# Patient Record
Sex: FEMALE | Race: BLACK OR AFRICAN AMERICAN | Employment: OTHER | URBAN - METROPOLITAN AREA
[De-identification: names, ages, dates, MRNs, and addresses within clinical notes are randomized per-mention and may not be internally consistent; named-entity substitution may affect disease eponyms.]

---

## 2017-05-01 ENCOUNTER — OFFICE VISIT (OUTPATIENT)
Dept: ENDOCRINOLOGY | Age: 72
End: 2017-05-01

## 2017-05-01 VITALS
BODY MASS INDEX: 31.86 KG/M2 | WEIGHT: 203 LBS | SYSTOLIC BLOOD PRESSURE: 138 MMHG | HEART RATE: 80 BPM | DIASTOLIC BLOOD PRESSURE: 73 MMHG | HEIGHT: 67 IN

## 2017-05-01 DIAGNOSIS — E05.90 SUBCLINICAL HYPERTHYROIDISM: Primary | ICD-10-CM

## 2017-05-01 DIAGNOSIS — E04.2 MULTINODULAR GOITER (NONTOXIC): ICD-10-CM

## 2017-05-01 RX ORDER — BISMUTH SUBSALICYLATE 262 MG
1 TABLET,CHEWABLE ORAL DAILY
COMMUNITY

## 2017-05-01 NOTE — PROGRESS NOTES
Chief Complaint   Patient presents with    Thyroid Problem     will call with name of PCP, Pharmacy verified. History of Present Illness: Lonny Rojas is a 73 y.o. female here for follow up of subclinical hypothyroidism and MNG. I initially saw Ms Holland Seo in October 2015. Her TSH was 0.2 with normal T3/T4, normal TRAb, and TPO with unremarkable thyroid Uptake and scan. I did not feel she needed any treatment or therapy at that time. During her work up for abnormal thyroid labs her PCP ordered a thyroid US that showed Right thyroid lobe measuring [id number] mm, the left thyroid lobe measuring 69m62a03 mm and multiple bilateral nodules, solid and complex, all sub-CM. Largest 4mm on right an 7mm on left. At our last visit in August 2016 she was clinically euthyroid and her TSH was 0.324 with FT4 of 1.10 and TT3 of 99. She was not started on any therapy at that time. I ordered a repeat thyroid US but this was never performed. Pt notes she has been having issues of aching in her knees and legs, she notes her PCP is testing her for OA and osteoporosis. She denies issues of CP, palpitations, SOB, tremors, heat or cold intolerance, diarrhea or constipation. She denies LE edema, dysphagia, dysphonia, or chocking. No recent illnesses, injuries or hospitalizations. Pt has a hx of well controlled DM, HTN, HLD, hypovitaminosis D and peripheral neuropathy. No hx of CVA or CAD. Pt notes she has been able to stop her DM medications. Current Outpatient Prescriptions   Medication Sig    multivitamin (ONE A DAY) tablet Take 1 Tab by mouth daily.  atorvastatin (LIPITOR) 40 mg tablet     lisinopril (PRINIVIL, ZESTRIL) 20 mg tablet daily.  naproxen (NAPROSYN) 500 mg tablet as needed.  traZODone (DESYREL) 50 mg tablet nightly as needed.  fluticasone (FLONASE) 50 mcg/actuation nasal spray nightly.  aspirin 81 mg tablet Take 81 mg by mouth.      No current facility-administered medications for this visit. No Known Allergies  Review of Systems:  - Cardiovascular: no chest pain  - Neurological: no tremors  - Integumentary: skin is normal    Physical Examination:  Blood pressure 138/73, pulse 80, height 5' 7\" (1.702 m), weight 203 lb (92.1 kg). - General: pleasant, no distress, good eye contact   - Neck: no thyromegaly or thyroid bruits  - Cardiovascular: regular, normal rate, nl s1 and s2, no m/r/g   - Integumentary: skin is normal, no edema  - Neurological: reflexes 2+ at biceps, no tremors  - Psychiatric: normal mood and affect    Data Reviewed: Will request labs from PCP    Assessment/Plan:   1) Subclinical hyperthyroidism > Pt is clinically euthyroid, has no hx of cardiac or arrhythmia issues. Will check TFTs today and continue to monitor. If her TSH drops below 0.1 or her T3/T4 begin to elevate will need to discuss treatment options. 2) Multinodular goiter > Will order a thyroid US to look for evidence of change in the thyroid nodules. Pt voices understanding and agreement with the plan. Pain noted and pt was recommended to call her PCP for further evaluation and treatment, as needed    RTC 1 year      Follow-up Disposition:  Return in about 1 year (around 5/1/2018).     Copy sent to:  Dr. Lauren Mcghee

## 2017-05-02 LAB
T3 SERPL-MCNC: 111 NG/DL (ref 71–180)
T4 FREE SERPL-MCNC: 0.96 NG/DL (ref 0.82–1.77)
TSH SERPL DL<=0.005 MIU/L-ACNC: 0.37 UIU/ML (ref 0.45–4.5)

## 2017-05-02 NOTE — PROGRESS NOTES
Spoke with pt regarding her labs. Pt's TSH is stable with normal T3/T4. Pt has Subclinical hyperthyroidism. No plans to start any treatment at this time. Thyroid US pending.

## 2017-05-05 ENCOUNTER — HOSPITAL ENCOUNTER (OUTPATIENT)
Dept: ULTRASOUND IMAGING | Age: 72
Discharge: HOME | End: 2017-05-05
Attending: INTERNAL MEDICINE
Payer: MEDICARE

## 2017-05-05 DIAGNOSIS — E04.2 MULTINODULAR GOITER (NONTOXIC): ICD-10-CM

## 2017-05-05 PROCEDURE — 76536 US EXAM OF HEAD AND NECK: CPT

## 2017-05-05 NOTE — PROGRESS NOTES
Her Thyroid Ultrasound is looking good, there is no change from 2015. No further work-up or testing is needed at this time. I will see her back in 1 year.

## 2017-10-11 ENCOUNTER — HOSPITAL ENCOUNTER (OUTPATIENT)
Dept: MAMMOGRAPHY | Age: 72
Discharge: HOME | End: 2017-10-11
Attending: GENERAL ACUTE CARE HOSPITAL
Payer: MEDICARE

## 2017-10-11 DIAGNOSIS — Z12.31 VISIT FOR SCREENING MAMMOGRAM: ICD-10-CM

## 2017-10-11 PROCEDURE — 77067 SCR MAMMO BI INCL CAD: CPT

## 2018-05-01 ENCOUNTER — OFFICE VISIT (OUTPATIENT)
Dept: ENDOCRINOLOGY | Age: 73
End: 2018-05-01

## 2018-05-01 VITALS
DIASTOLIC BLOOD PRESSURE: 71 MMHG | HEIGHT: 67 IN | HEART RATE: 83 BPM | SYSTOLIC BLOOD PRESSURE: 121 MMHG | WEIGHT: 202.2 LBS | BODY MASS INDEX: 31.74 KG/M2

## 2018-05-01 DIAGNOSIS — E04.2 MULTINODULAR GOITER (NONTOXIC): ICD-10-CM

## 2018-05-01 DIAGNOSIS — E05.90 SUBCLINICAL HYPERTHYROIDISM: Primary | ICD-10-CM

## 2018-05-01 RX ORDER — LISINOPRIL AND HYDROCHLOROTHIAZIDE 12.5; 2 MG/1; MG/1
TABLET ORAL DAILY
COMMUNITY
Start: 2018-04-02

## 2018-05-01 RX ORDER — OMEPRAZOLE 20 MG/1
CAPSULE, DELAYED RELEASE ORAL DAILY
COMMUNITY
Start: 2018-04-20

## 2018-05-01 NOTE — PROGRESS NOTES
Chief Complaint   Patient presents with    Thyroid Problem     pcp and pharmacy verified   Records since last visit reviewed  History of Present Illness: Dick Merritt is a 81 y.o. female here for follow up of subclinical hypothyroidism and MNG. I initially saw Ms Dionne Gonzales in October 2015. Her TSH was 0.2 with normal T3/T4, normal TRAb, and TPO with unremarkable thyroid Uptake and scan. I did not feel she needed any treatment or therapy at that time. During her work up for abnormal thyroid labs her PCP ordered a thyroid US that showed Right thyroid lobe measuring [id number] mm, the left thyroid lobe measuring [id number] mm and multiple bilateral nodules, solid and complex, all sub-CM. Largest 4mm on right an 7mm on left. At our last visit in August 2017 she was clinically euthyroid and her TSH was 0.369 with FT4 of 0.96 and TT3 of 111. She was not started on any therapy at that time. I ordered a repeat thyroid US and it was unchanged from 2015. Pt notes she has been having issues of aching in her knees and legs, she notes her PCP has diagnosed her with OA. She denies issues of CP, palpitations, SOB, tremors, heat or cold intolerance, diarrhea or constipation. She denies LE edema, dysphagia, dysphonia, or chocking. No recent illnesses, injuries or hospitalizations. Pt has a hx of well controlled DM, HTN, HLD, hypovitaminosis D and peripheral neuropathy. No hx of CVA or CAD. Pt notes she has been able to stop her DM medications. Current Outpatient Prescriptions   Medication Sig    lisinopril-hydroCHLOROthiazide (PRINZIDE, ZESTORETIC) 20-12.5 mg per tablet daily.  omeprazole (PRILOSEC) 20 mg capsule     multivitamin (ONE A DAY) tablet Take 1 Tab by mouth daily.  atorvastatin (LIPITOR) 40 mg tablet     naproxen (NAPROSYN) 500 mg tablet as needed.  traZODone (DESYREL) 50 mg tablet nightly as needed.  fluticasone (FLONASE) 50 mcg/actuation nasal spray nightly.     aspirin 81 mg tablet Take 81 mg by mouth. No current facility-administered medications for this visit. No Known Allergies  Review of Systems:  - Cardiovascular: no chest pain  - Neurological: no tremors  - Integumentary: skin is normal    Physical Examination:  Blood pressure 121/71, pulse 83, height 5' 7\" (1.702 m), weight 202 lb 3.2 oz (91.7 kg). - General: pleasant, no distress, good eye contact   - Neck: no thyromegaly or thyroid bruits  - Cardiovascular: regular, normal rate, nl s1 and s2, no m/r/g   - Integumentary: skin is normal, no edema  - Neurological: reflexes 2+ at biceps, no tremors  - Psychiatric: normal mood and affect    Data Reviewed: Will request labs from PCP    Assessment/Plan:   1) Subclinical hyperthyroidism > Pt is clinically euthyroid, has no hx of cardiac or arrhythmia issues. Will check TFTs today and continue to monitor. If her TSH drops below 0.1 or her T3/T4 begin to elevate will need to discuss treatment options. 2) Multinodular goiter > Her thyroid US in 2017 was stable from 2015 with no clinically significant changes. No need for further work-up or evaluation, unless she has a change in her clinical picture. Pt voices understanding and agreement with the plan. Pain noted and pt was recommended to call her PCP for further evaluation and treatment, as needed    RTC 1 year      Follow-up Disposition:  Return in about 1 year (around 5/1/2019).     Copy sent to:  Dr. Rosales Lee

## 2018-05-01 NOTE — MR AVS SNAPSHOT
[address] [place] [address] [address] [address] [phone] Patient: Heath Salinas MRN: [id number] Visit Information Date & Time Provider Department Dept. Phone Encounter #  
 5/1/2018  9:10 AM Radha Perez, [address] [place] [address] Diabetes and Endocrinology 21  Follow-up Instructions Return in about 1 year (around 5/1/2019). Upcoming Health Maintenance Date Due Hepatitis C Screening 1945 DTaP/Tdap/Td series (1 - Tdap) 11/3/1968 FOBT Q 1 YEAR AGE 50-75 11/3/1997 ZOSTER VACCINE AGE 60> 9/3/2007 GLAUCOMA SCREENING Q2Y 11/3/2012 Bone Densitometry (Dexa) Screening 11/3/2012 Pneumococcal 65+ Low/Medium Risk (1 of 2 - PCV13) 11/3/2012 MEDICARE YEARLY EXAM 3/14/2018 Influenza Age 5 to Adult 8/1/2018 BREAST CANCER SCRN MAMMOGRAM 10/11/2019 Allergies as of 5/1/2018  Review Complete On: 5/1/2018 By: Radha Perez MD  
 No Known Allergies Current Immunizations  Never Reviewed No immunizations on file. Not reviewed this visit You Were Diagnosed With   
  
 Codes Comments Subclinical hyperthyroidism    -  Primary ICD-10-CM: E05.90 ICD-9-CM: 242.90 Multinodular goiter (nontoxic)     ICD-10-CM: I61.0 ICD-9-CM: 680. 1 Vitals BP Pulse Height(growth percentile) Weight(growth percentile) BMI Smoking Status 121/71 83 5' 7\" (1.702 m) 202 lb 3.2 oz (91.7 kg) 31.67 kg/m2 Former Smoker BMI and BSA Data Body Mass Index Body Surface Area  
 31.67 kg/m 2 2.08 m 2 [facility] [address] [phone] Your Updated Medication List  
  
   
This list is accurate as of 5/1/18  9:38 AM.  Always use your most recent med list.  
  
  
  
  
 aspirin 81 mg tablet Take 81 mg by mouth. atorvastatin 40 mg tablet Commonly known as:  LIPITOR fluticasone 50 mcg/actuation nasal spray Commonly known as:  FLONASE  
nightly. lisinopril-hydroCHLOROthiazide 20-12.5 mg per tablet Commonly known as:  PRINZIDE, ZESTORETIC  
daily. multivitamin tablet Commonly known as:  ONE A DAY Take 1 Tab by mouth daily. naproxen 500 mg tablet Commonly known as:  NAPROSYN  
as needed. omeprazole 20 mg capsule Commonly known as:  PRILOSEC  
  
 traZODone 50 mg tablet Commonly known as:  DESYREL  
nightly as needed. We Performed the Following   
 T3 TOTAL [id number] CPT(R)] T4, FREE [id number] CPT(R)] TSH 3RD GENERATION [49949 CPT(R)] Follow-up Instructions Return in about 1 year (around 5/1/2019). Introducing [facility]! [employer] introduces [employer] patient portal. Now you can access parts of your medical record, email your doctor's office, and request medication refills online. 1. In your internet browser, go to [email or web address]"[email or web address]. [employer]/"[employer] 2. Click on the First Time User? Click Here link in the Sign In box. You will see the New Member Sign Up page. 3. Enter your [employer] Access Code exactly as it appears below. You will not need to use this code after youve completed the sign-up process. If you do not sign up before the expiration date, you must request a new code. · [employer] Access Code: [email or web address] Expires: 7/30/2018  9:38 AM 
 
4. Enter the last four digits of your Social Security Number (xxxx) and Date of Birth (mm/dd/yyyy) as indicated and click Submit. You will be taken to the next sign-up page. 5. Create a [employer] ID. This will be your [employer] login ID and cannot be changed, so think of one that is secure and easy to remember. 6. Create a [employer] password. You can change your password at any time. 7. Enter your Password Reset Question and Answer. This can be used at a later time if you forget your password. 8. Enter your e-mail address. You will receive e-mail notification when new information is available in [address] 9. Click Sign Up. You can now view and download portions of your medical record. 10. Click the Download Summary menu link to download a portable copy of your medical information. If you have questions, please visit the Frequently Asked Questions section of the [employer] website. Remember, [employer] is NOT to be used for urgent needs. For medical emergencies, dial 911. Now available from your iPhone and Android! Please provide this summary of care documentation to your next provider. Your primary care clinician is listed as Johanny Morales. If you have any questions after today's visit, please call [phone].

## 2018-05-02 LAB
T3 SERPL-MCNC: 121 NG/DL (ref 71–180)
T4 FREE SERPL-MCNC: 1.09 NG/DL (ref 0.82–1.77)
TSH SERPL DL<=0.005 MIU/L-ACNC: 0.38 UIU/ML (ref 0.45–4.5)

## 2018-05-02 NOTE — PROGRESS NOTES
Spoke with pt regarding her TFTs. Her TSH is stable and her T3/T4 are in the normal range. No need to start any thyroid medications at this time.

## 2018-10-29 ENCOUNTER — HOSPITAL ENCOUNTER (OUTPATIENT)
Dept: MAMMOGRAPHY | Age: 73
Discharge: HOME | End: 2018-10-29
Attending: FAMILY MEDICINE
Payer: MEDICARE

## 2018-10-29 DIAGNOSIS — Z12.39 SCREENING BREAST EXAMINATION: ICD-10-CM

## 2018-10-29 PROCEDURE — 77067 SCR MAMMO BI INCL CAD: CPT

## 2019-04-22 ENCOUNTER — OFFICE VISIT (OUTPATIENT)
Dept: ENDOCRINOLOGY | Age: 74
End: 2019-04-22

## 2019-04-22 VITALS
DIASTOLIC BLOOD PRESSURE: 69 MMHG | WEIGHT: 195 LBS | HEART RATE: 92 BPM | SYSTOLIC BLOOD PRESSURE: 120 MMHG | BODY MASS INDEX: 30.61 KG/M2 | HEIGHT: 67 IN

## 2019-04-22 DIAGNOSIS — E05.90 SUBCLINICAL HYPERTHYROIDISM: Primary | ICD-10-CM

## 2019-04-22 DIAGNOSIS — E04.2 MULTINODULAR GOITER (NONTOXIC): ICD-10-CM

## 2019-04-22 NOTE — PROGRESS NOTES
Chief Complaint Patient presents with  Thyroid Problem  
  pcp and pharmacy verified Records since last visit reviewed History of Present Illness: Laura Starr is a 72 y.o. female here for follow up of subclinical hypothyroidism and MNG. I initially saw Ms Liss Roque in October 2015. Her TSH was 0.2 with normal T3/T4, normal TRAb, and TPO with unremarkable thyroid Uptake and scan. I did not feel she needed any treatment or therapy at that time. During her work up for abnormal thyroid labs her PCP ordered a thyroid US that showed Right thyroid lobe measuring [id number] mm, the left thyroid lobe measuring 60b16i36 mm and multiple bilateral nodules, solid and complex, all sub-CM. Largest 4mm on right an 7mm on left. At our last visit in May 2018 her TSH was 0.38 with FT4 of 1.09 and TT3 of 121. She was clinically euthyroid and her TSH was stable so no anti-thyroid medication was needed. She denies issues of CP, palpitations, SOB, tremors, heat or cold intolerance, diarrhea or constipation. She denies dysphagia, dysphonia, or chocking. No recent illnesses, injuries or hospitalizations. Current Outpatient Medications Medication Sig  
 lisinopril-hydroCHLOROthiazide (PRINZIDE, ZESTORETIC) 20-12.5 mg per tablet daily.  omeprazole (PRILOSEC) 20 mg capsule daily.  multivitamin (ONE A DAY) tablet Take 1 Tab by mouth daily.  atorvastatin (LIPITOR) 40 mg tablet  naproxen (NAPROSYN) 500 mg tablet as needed.  traZODone (DESYREL) 50 mg tablet nightly as needed.  fluticasone (FLONASE) 50 mcg/actuation nasal spray nightly.  aspirin 81 mg tablet Take 81 mg by mouth. No current facility-administered medications for this visit. No Known Allergies Review of Systems: 
- Cardiovascular: no chest pain 
- Neurological: no tremors - Integumentary: skin is normal 
 
Physical Examination: 
Blood pressure 120/69, pulse 92, height 5' 7\" (1.702 m), weight 195 lb (88.5 kg). - General: pleasant, no distress, good eye contact  
- Neck: no thyromegaly or thyroid bruits - Cardiovascular: regular, normal rate, nl s1 and s2, no m/r/g - Integumentary: skin is normal, no edema 
- Neurological: reflexes 2+ at biceps, no tremors - Psychiatric: normal mood and affect Data Reviewed: Will request labs from PCP Assessment/Plan:  
1) Subclinical hyperthyroidism > Pt is clinically euthyroid, has no hx of cardiac or arrhythmia issues. Will check TFTs today. If her TFTs are stable, then they will not have shown any change since 2015 and I feel she will not need to continue to see me any longer. 2) Multinodular goiter > Her thyroid US in 2017 was stable from 2015 with no clinically significant changes. No need for further work-up or evaluation, unless she has a change in her clinical picture. Pt voices understanding and agreement with the plan. Pain noted and pt was recommended to call her PCP for further evaluation and treatment, as needed Copy sent to: Dr. Broderick Carson

## 2019-04-23 LAB
T3 SERPL-MCNC: 96 NG/DL (ref 71–180)
T4 FREE SERPL-MCNC: 1.02 NG/DL (ref 0.82–1.77)
TSH SERPL DL<=0.005 MIU/L-ACNC: 0.26 UIU/ML (ref 0.45–4.5)

## 2019-04-23 NOTE — PROGRESS NOTES
Pt's TFTs are looking stable and she is clinically euthyroid. Pt does not need any anti-thyroid medications and no further follow ups are needed.

## 2019-11-19 ENCOUNTER — HOSPITAL ENCOUNTER (OUTPATIENT)
Dept: MAMMOGRAPHY | Age: 74
Discharge: HOME | End: 2019-11-19
Attending: FAMILY MEDICINE
Payer: MEDICARE

## 2019-11-19 DIAGNOSIS — Z12.31 ENCOUNTER FOR SCREENING MAMMOGRAM FOR MALIGNANT NEOPLASM OF BREAST: ICD-10-CM

## 2019-11-19 PROCEDURE — 77067 SCR MAMMO BI INCL CAD: CPT

## 2021-02-22 ENCOUNTER — TRANSCRIBE ORDER (OUTPATIENT)
Dept: SCHEDULING | Age: 76
End: 2021-02-22

## 2021-02-22 DIAGNOSIS — Z12.31 SCREENING MAMMOGRAM FOR HIGH-RISK PATIENT: Primary | ICD-10-CM

## 2021-07-06 ENCOUNTER — HOSPITAL ENCOUNTER (OUTPATIENT)
Dept: MAMMOGRAPHY | Age: 76
Discharge: HOME | End: 2021-07-06
Attending: FAMILY MEDICINE
Payer: MEDICARE

## 2021-07-06 DIAGNOSIS — Z12.31 SCREENING MAMMOGRAM FOR HIGH-RISK PATIENT: ICD-10-CM

## 2021-07-06 PROCEDURE — 77067 SCR MAMMO BI INCL CAD: CPT

## 2021-12-29 ENCOUNTER — OFFICE VISIT (OUTPATIENT)
Dept: URGENT CARE | Age: 76
End: 2021-12-29
Payer: MEDICARE

## 2021-12-29 VITALS — HEART RATE: 72 BPM | OXYGEN SATURATION: 99 % | RESPIRATION RATE: 16 BRPM | TEMPERATURE: 97.6 F

## 2021-12-29 DIAGNOSIS — Z20.822 EXPOSURE TO COVID-19 VIRUS: Primary | ICD-10-CM

## 2021-12-29 LAB — SARS-COV-2 POC: NEGATIVE

## 2021-12-29 PROCEDURE — 99202 OFFICE O/P NEW SF 15 MIN: CPT | Performed by: NURSE PRACTITIONER

## 2021-12-29 PROCEDURE — 1101F PT FALLS ASSESS-DOCD LE1/YR: CPT | Performed by: NURSE PRACTITIONER

## 2021-12-29 PROCEDURE — G8536 NO DOC ELDER MAL SCRN: HCPCS | Performed by: NURSE PRACTITIONER

## 2021-12-29 PROCEDURE — G8400 PT W/DXA NO RESULTS DOC: HCPCS | Performed by: NURSE PRACTITIONER

## 2021-12-29 PROCEDURE — G8432 DEP SCR NOT DOC, RNG: HCPCS | Performed by: NURSE PRACTITIONER

## 2021-12-29 PROCEDURE — 87426 SARSCOV CORONAVIRUS AG IA: CPT | Performed by: NURSE PRACTITIONER

## 2021-12-29 PROCEDURE — G9899 SCRN MAM PERF RSLTS DOC: HCPCS | Performed by: NURSE PRACTITIONER

## 2021-12-29 PROCEDURE — 1090F PRES/ABSN URINE INCON ASSESS: CPT | Performed by: NURSE PRACTITIONER

## 2021-12-29 PROCEDURE — G8421 BMI NOT CALCULATED: HCPCS | Performed by: NURSE PRACTITIONER

## 2021-12-29 PROCEDURE — 3017F COLORECTAL CA SCREEN DOC REV: CPT | Performed by: NURSE PRACTITIONER

## 2021-12-29 PROCEDURE — G8427 DOCREV CUR MEDS BY ELIG CLIN: HCPCS | Performed by: NURSE PRACTITIONER

## 2021-12-29 NOTE — PROGRESS NOTES
This patient was seen at [address] [place] [address] Urgent Care while in their vehicle due to COVID-19 pandemic with PPE and focused examination in order to decrease community viral transmission. The patient/guardian gave verbal consent to treat. Sherren Martyr is a 78 y.o. female who presents for COVID-19 testing. Was exposed to COVID-19 about 10 days ago. Denies any symptoms such as cough, SOB, chest tightness, congestion, ST, HA, n/v/d, fever etc. No other complaints or concerns at this time. Patient completed COVID vaccinations x 3 more than one month ago. PMH: HTN. Non-smoker. Past Medical History:   Diagnosis Date    Hypertension     Sebaceous cyst of breast 8/19/2013        Past Surgical History:   Procedure Laterality Date    HX BREAST BIOPSY Left 2000    benign    HX HYSTERECTOMY      HX TONSILLECTOMY           Family History   Problem Relation Age of Onset    Hypertension Mother     Diabetes Mother     Dementia Mother     Hypertension Sister     Sleep Apnea Sister     Heart Disease Father         MI    Heart Disease Maternal Grandmother     Hypertension Maternal Grandmother     Cancer Paternal Grandmother         Ovarian    Thyroid Disease Daughter         Social History     Socioeconomic History    Marital status:      Spouse name: Not on file    Number of children: Not on file    Years of education: Not on file    Highest education level: Not on file   Occupational History    Not on file   Tobacco Use    Smoking status: Former Smoker    Smokeless tobacco: Never Used   Substance and Sexual Activity    Alcohol use:  Yes    Drug use: Not on file    Sexual activity: Not on file   Other Topics Concern    Not on file   Social History Narrative    Not on file     Social Determinants of Health     Financial Resource Strain:     Difficulty of Paying Living Expenses: Not on file   Food Insecurity:     Worried About Running Out of Food in the Last Year: Not on file    Ran Out of Food in the Last Year: Not on file   Transportation Needs:     Lack of Transportation (Medical): Not on file    Lack of Transportation (Non-Medical): Not on file   Physical Activity:     Days of Exercise per Week: Not on file    Minutes of Exercise per Session: Not on file   Stress:     Feeling of Stress : Not on file   Social Connections:     Frequency of Communication with Friends and Family: Not on file    Frequency of Social Gatherings with Friends and Family: Not on file    Attends Mosque Services: Not on file    Active Member of [address] [place] [address] [employer] or Organizations: Not on file    Attends Club or Organization Meetings: Not on file    Marital Status: Not on file   Intimate Partner Violence:     Fear of Current or Ex-Partner: Not on file    Emotionally Abused: Not on file    Physically Abused: Not on file    Sexually Abused: Not on file   Housing Stability:     Unable to Pay for Housing in the Last Year: Not on file    Number of [place] in the Last Year: Not on file    Unstable Housing in the Last Year: Not on file                ALLERGIES: Patient has no known allergies. Review of Systems   Constitutional: Negative for activity change, appetite change, chills, diaphoresis, fatigue and fever. HENT: Negative for congestion, ear pain, rhinorrhea, sinus pressure, sinus pain and sore throat. Respiratory: Negative for cough, chest tightness, shortness of breath and wheezing. Cardiovascular: Negative for chest pain. Gastrointestinal: Negative for abdominal pain, constipation, diarrhea, nausea and vomiting. Musculoskeletal: Negative for myalgias. Skin: Negative for rash. Neurological: Negative for dizziness, weakness, light-headedness and headaches. Vitals:    12/29/21 0940   Pulse: 72   Resp: 16   Temp: 97.6 °F (36.4 °C)   SpO2: 99%       Physical Exam  Vitals and nursing note reviewed. Constitutional:       General: She is not in acute distress.      Appearance: Normal appearance. She is not ill-appearing. HENT:      Head: Normocephalic and atraumatic. Mouth/Throat:      Mouth: Mucous membranes are moist.   Eyes:      Conjunctiva/sclera: Conjunctivae normal.      Pupils: Pupils are equal, round, and reactive to light. Cardiovascular:      Rate and Rhythm: Normal rate. Pulmonary:      Effort: Pulmonary effort is normal.   Musculoskeletal:         General: Normal range of motion. Cervical back: No muscular tenderness. Skin:     General: Skin is warm and dry. Findings: No rash. Neurological:      Mental Status: She is alert and oriented to person, place, and time. Psychiatric:         Mood and Affect: Mood normal.         Behavior: Behavior normal.         MDM    Procedures      ICD-10-CM ICD-9-CM   1. Exposure to COVID-19 virus  Z20.822 V01.79       Orders Placed This Encounter    AMB POC SARS-COV-2 ANTIGEN     Order Specific Question:   Is this test for diagnosis or screening? Answer:   Screening     Order Specific Question:   Symptomatic for COVID-19 as defined by CDC? Answer:   No     Order Specific Question:   Hospitalized for COVID-19? Answer:   No     Order Specific Question:   Admitted to ICU for COVID-19? Answer:   No     Order Specific Question:   Employed in healthcare setting? Answer:   Unknown     Order Specific Question:   Resident in a congregate (group) care setting? Answer:   Unknown     Order Specific Question:   Pregnant? Answer:   No     Order Specific Question:   Previously tested for COVID-19? Answer:   Unknown      Results for orders placed or performed in visit on 12/29/21   AMB POC SARS-COV-2   Result Value Ref Range    SARS-COV-2 POC Negative Negative     Quarantine recommendations reviewed per CDC guidelines. Encouraged deep breathing exercises, ambulation, Tylenol prn- should symptoms develop. Increase fluids with electrolytes    The patient is to follow up with PCP PRN.      Red flag signs and symptoms discussed with patient/parent indicated need for ED evaluation and treatment.     Signed By: Melisa Friday, NP     December 29, 2021

## 2022-07-12 ENCOUNTER — TRANSCRIBE ORDER (OUTPATIENT)
Dept: SCHEDULING | Age: 77
End: 2022-07-12

## 2022-07-12 DIAGNOSIS — M81.0 SENILE OSTEOPOROSIS: ICD-10-CM

## 2022-07-12 DIAGNOSIS — Z12.31 SCREENING MAMMOGRAM FOR HIGH-RISK PATIENT: Primary | ICD-10-CM

## 2022-07-21 ENCOUNTER — HOSPITAL ENCOUNTER (OUTPATIENT)
Dept: MAMMOGRAPHY | Age: 77
Discharge: HOME | End: 2022-07-21
Attending: FAMILY MEDICINE
Payer: MEDICARE

## 2022-07-21 DIAGNOSIS — Z12.31 SCREENING MAMMOGRAM FOR HIGH-RISK PATIENT: ICD-10-CM

## 2022-07-21 DIAGNOSIS — M81.0 SENILE OSTEOPOROSIS: ICD-10-CM

## 2022-07-21 PROCEDURE — 77067 SCR MAMMO BI INCL CAD: CPT

## 2022-07-21 PROCEDURE — 77080 DXA BONE DENSITY AXIAL: CPT

## 2023-08-01 ENCOUNTER — HOSPITAL ENCOUNTER (OUTPATIENT)
Facility: HOSPITAL | Age: 78
Discharge: HOME | End: 2023-08-04
Payer: MEDICARE

## 2023-08-01 VITALS — WEIGHT: 177 LBS | BODY MASS INDEX: 26.22 KG/M2 | HEIGHT: 69 IN

## 2023-08-01 DIAGNOSIS — Z12.31 SCREENING MAMMOGRAM FOR HIGH-RISK PATIENT: ICD-10-CM

## 2023-08-01 PROCEDURE — 77067 SCR MAMMO BI INCL CAD: CPT

## 2024-07-29 ENCOUNTER — TRANSCRIBE ORDERS (OUTPATIENT)
Facility: HOSPITAL | Age: 79
End: 2024-07-29

## 2024-07-29 DIAGNOSIS — Z12.31 ENCOUNTER FOR SCREENING MAMMOGRAM FOR MALIGNANT NEOPLASM OF BREAST: Primary | ICD-10-CM

## 2024-08-06 ENCOUNTER — HOSPITAL ENCOUNTER (OUTPATIENT)
Facility: HOSPITAL | Age: 79
Discharge: HOME | End: 2024-08-09
Payer: MEDICARE

## 2024-08-06 VITALS — HEIGHT: 67 IN | WEIGHT: 172 LBS | BODY MASS INDEX: 27 KG/M2

## 2024-08-06 DIAGNOSIS — Z12.31 ENCOUNTER FOR SCREENING MAMMOGRAM FOR MALIGNANT NEOPLASM OF BREAST: ICD-10-CM

## 2024-08-06 PROCEDURE — 77063 BREAST TOMOSYNTHESIS BI: CPT

## 2024-11-13 ENCOUNTER — APPOINTMENT (OUTPATIENT)
Facility: HOSPITAL | Age: 79
End: 2024-11-13
Payer: MEDICARE

## 2024-11-13 ENCOUNTER — HOSPITAL ENCOUNTER (EMERGENCY)
Facility: HOSPITAL | Age: 79
Discharge: HOME | End: 2024-11-13
Attending: EMERGENCY MEDICINE
Payer: MEDICARE

## 2024-11-13 VITALS
TEMPERATURE: 97.6 F | WEIGHT: 170 LBS | HEART RATE: 66 BPM | HEIGHT: 68 IN | BODY MASS INDEX: 25.76 KG/M2 | DIASTOLIC BLOOD PRESSURE: 76 MMHG | SYSTOLIC BLOOD PRESSURE: 148 MMHG | RESPIRATION RATE: 17 BRPM | OXYGEN SATURATION: 98 %
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
## 2024-11-13 DIAGNOSIS — S09.90XA INJURY OF HEAD, INITIAL ENCOUNTER: ICD-10-CM

## 2024-11-13 PROCEDURE — 99284 EMERGENCY DEPT VISIT MOD MDM: CPT

## 2024-11-13 PROCEDURE — 70450 CT HEAD/BRAIN W/O DYE: CPT

## 2024-11-13 PROCEDURE — 6370000000 HC RX 637 (ALT 250 FOR IP): Performed by: EMERGENCY MEDICINE

## 2024-11-13 RX ORDER — ACETAMINOPHEN 500 MG
1000 TABLET ORAL
Status: COMPLETED | OUTPATIENT
Start: 2024-11-13 | End: 2024-11-13

## 2024-11-13 RX ADMIN — ACETAMINOPHEN 1000 MG: 500 TABLET ORAL at 16:35

## 2024-11-13 ASSESSMENT — LIFESTYLE VARIABLES
HOW MANY STANDARD DRINKS CONTAINING ALCOHOL DO YOU HAVE ON A TYPICAL DAY: PATIENT DOES NOT DRINK
HOW OFTEN DO YOU HAVE A DRINK CONTAINING ALCOHOL: NEVER

## 2024-11-13 ASSESSMENT — PAIN DESCRIPTION - ORIENTATION: ORIENTATION: LEFT;RIGHT

## 2024-11-13 ASSESSMENT — PAIN SCALES - GENERAL: PAINLEVEL_OUTOF10: 3

## 2024-11-13 ASSESSMENT — PAIN DESCRIPTION - LOCATION: LOCATION: ARM;HEAD

## 2024-11-13 ASSESSMENT — PAIN - FUNCTIONAL ASSESSMENT: PAIN_FUNCTIONAL_ASSESSMENT: 0-10

## 2024-11-13 NOTE — DISCHARGE INSTRUCTIONS
Thank you for choosing our Emergency Department for your care.  It is our privilege to care for you in your time of need.  In the next several days, you may receive a survey via email or mailed to your home about your experience with our team.  We would greatly appreciate you taking a few minutes to complete the survey, as we use this information to learn what we have done well and what we could be doing better. Thank you for trusting us with your care!    Below you will find a list of your tests from today's visit.   Labs  No results found for this or any previous visit (from the past 12 hour(s)).    Radiologic Studies  CT HEAD WO CONTRAST   Final Result   No acute intracranial abnormality.            Electronically signed by Isaac Rosa MD        ------------------------------------------------------------------------------------------------------------  The evaluation and treatment you received in the Emergency Department were for an urgent problem. It is important that you follow-up with a doctor, nurse practitioner, or physician assistant to:  (1) confirm your diagnosis,  (2) re-evaluation of changes in your illness and treatment, and (3) for ongoing care. Please take your discharge instructions with you when you go to your follow-up appointment.     If you have any problem arranging a follow-up appointment, contact us!  If your symptoms become worse or you do not improve as expected, please return to us. We are available 24 hours a day.     If a prescription has been provided, please fill it as soon as possible to prevent a delay in treatment. If you have any questions or reservations about taking the medication due to side effects or interactions with other medications, please call your primary care provider or contact us directly.  Again, THANK YOU for choosing us to care for YOU!

## 2024-11-13 NOTE — ED PROVIDER NOTES
doctor about these medications      atorvastatin 40 MG tablet  Commonly known as: LIPITOR     fluticasone 50 MCG/ACT nasal spray  Commonly known as: FLONASE     lisinopril-hydroCHLOROthiazide 20-12.5 MG per tablet  Commonly known as: PRINZIDE;ZESTORETIC     naproxen 500 MG tablet  Commonly known as: NAPROSYN     omeprazole 20 MG delayed release capsule  Commonly known as: PRILOSEC     traZODone 50 MG tablet  Commonly known as: DESYREL              DISCONTINUED MEDICATIONS:  Current Discharge Medication List        ED FINAL IMPRESSION     1. Fall, initial encounter    2. Injury of head, initial encounter         I am the primary provider of record. Keith Dominguez DO (electronically signed)    (Please note that parts of this dictation were completed with voice recognition software. Quite often unanticipated grammatical, syntax, homophones, and other interpretive errors are inadvertently transcribed by the computer software. Please disregards these errors. Please excuse any errors that have escaped final proofreading.)       Keith Dominguez DO  11/13/24 [id number]

## 2024-11-13 NOTE — ED NOTES
Pt verbalized that she needs a ride back to her car at [facility] in [place] and that she has no family local to pick her up. The pt was given a cab voucher to get back to her car so she could drive home.

## 2024-11-13 NOTE — ED TRIAGE NOTES
Pt brought in by EMS, was going into the foot doctor's office in a hurry and her feet gave way and hit her head, pt denies LOC and blood thinners.

## 2025-07-15 ENCOUNTER — TRANSCRIBE ORDERS (OUTPATIENT)
Facility: HOSPITAL | Age: 80
End: 2025-07-15

## 2025-07-15 DIAGNOSIS — Z12.31 VISIT FOR SCREENING MAMMOGRAM: Primary | ICD-10-CM

## 2025-08-11 ENCOUNTER — HOSPITAL ENCOUNTER (OUTPATIENT)
Facility: HOSPITAL | Age: 80
Discharge: HOME | End: 2025-08-14
Payer: MEDICARE

## 2025-08-11 VITALS — BODY MASS INDEX: 25.31 KG/M2 | WEIGHT: 167 LBS | HEIGHT: 68 IN

## 2025-08-11 DIAGNOSIS — Z12.31 VISIT FOR SCREENING MAMMOGRAM: ICD-10-CM

## 2025-08-11 PROCEDURE — 77067 SCR MAMMO BI INCL CAD: CPT
